# Patient Record
(demographics unavailable — no encounter records)

---

## 2024-12-04 NOTE — REVIEW OF SYSTEMS
[Change in Activity] : change in activity [Limping] : limping [Joint Pains] : arthralgias [Nl] : Psychiatric [NI] : Endocrine

## 2024-12-07 NOTE — REASON FOR VISIT
[Follow Up] : a follow up visit [Patient] : patient [Mother] : mother [FreeTextEntry1] : Right knee pain since 12/2/2024

## 2024-12-07 NOTE — DATA REVIEWED
[de-identified] : XR R knee 3 views obtained and independently reviewed in our office today: No evidence of any osseous abnormality, dislocation or fracture.

## 2024-12-07 NOTE — ASSESSMENT
[FreeTextEntry1] : Cecil is a 6 yo M with R knee pain.   XRs do not reveal any obvious osseous abnormality. Clinical exam reveals ttp over the patella, but is otherwise normal, no obvious hip discomfort, walking with a mild limp. Differential includes a bony contusion from an unwitnessed fall versus possible transient synovitis of the knee. No red flags for a septic joint at this time.  It was discussed that if the patient starts to develop fever, chills, erythema of the joint, or worsening pain, the patient should present to the emergency department for further evaluation.   No immobilization warranted, he may WBAT in regular shoes.  Patient can use tylenol/motrin as needed for pain.  We recommend avoiding gym/sports/physical activity. A school note was provided. We recommended f/u in our office in 2 weeks for clinical re-evaluation. No XR needs to be ordered in advance for f/u visit unless clinically indicated.  Today's visit included obtaining the history from the child and parent, due to the child's age, the child could not be considered a reliable historian, requiring the parent to act as an independent historian. The condition, natural history, and prognosis were explained to the patient and family. The clinical findings and images were reviewed with the family. All questions answered. Family expressed understanding and agreement with the above.  I, Ebony Kinney PA-C, acted as scribe and documented the above for Dr. Hoyt.

## 2024-12-07 NOTE — HISTORY OF PRESENT ILLNESS
[FreeTextEntry1] : Cecil is a 5-year-old male who presents with right knee pain the past 2 days. Mother reports that he came home from school 2 days ago reporting right knee pain.  No obvious injury event that patient or the school reported to family.  Every few steps patient was wanting to be picked up rather than ambulate independently.  He also had fevers Monday evening with congestion symptoms.  No obvious knee swelling or redness.  Mom did feel the knee was warm on Monday.  She kept him home from school yesterday and presents to our office today for further evaluation of right knee pain and possible injury.  No further fevers since Monday evening he is able to ambulate independently but will frequently request to be held instead of walking.  No other obvious joint discomfort he is not complaining of his hips or ankles.  He was treated by my colleague Dr. Hopkins for a left supracondylar fracture sustained on 8/4/24, which was able to be managed nonoperatively, and which patient reports full recovery from.

## 2024-12-07 NOTE — PHYSICAL EXAM
[FreeTextEntry1] : General: healthy appearing, acting appropriate for age.  HEENT: NCAT, Normal conjunctiva Cardio: Appears well perfused, no peripheral edema, brisk cap refill.  Lungs: no obvious increased WOB, no audible wheeze heard without use of stethoscope.  Abdomen: not examined.  Skin: No visible rashes on exposed skin  R knee:  No obvious effusion or swelling, no warmth, no redness. No ecchymosis or abrasions.  +TTP over the patella, reports discomfort with knee ROM, but is able to full range to the knee.  no ttp over any other structures of the RLE, no ttp over the structures of the R hip, femur, tibia, ankle.  Negative log roll. FROM of the hip without obvious discomfort or guarding.  NVI/SILT, moving toes freely WWP distally, brisk cap refill, 2+ DP  Gait: Able to ambulate independently with a mild limp.

## 2024-12-07 NOTE — DATA REVIEWED
[de-identified] : XR R knee 3 views obtained and independently reviewed in our office today: No evidence of any osseous abnormality, dislocation or fracture.

## 2024-12-07 NOTE — END OF VISIT
[FreeTextEntry3] : A physician assistant/resident assisted with documenting the visit and acted as a scribe. I have seen and examined the patient, made my assessment and plan and have made all modifications necessary to the note.  Yessica Hoyt MD Pediatric Orthopaedics Surgery [facility]

## 2024-12-07 NOTE — END OF VISIT
[FreeTextEntry3] : A physician assistant/resident assisted with documenting the visit and acted as a scribe. I have seen and examined the patient, made my assessment and plan and have made all modifications necessary to the note.  Yessica Hoyt MD Pediatric Orthopaedics Surgery [facility]

## 2025-01-08 NOTE — PHYSICAL EXAM
[FreeTextEntry1] : Pleasant and cooperative with exam, appropriate for age. Ambulates without evidence of antalgia and limp, good coordination and balance. He can run and jump with no pain.   Awake and alert appropriate for their age.   Skin: No rashes noted.  Eyes: Both conjunctiva, eyelids and pupils are present.  ENT:  Both ears, nose and lips are present. No nasal congestion.  Resp: No cough or wheezing noted.  Right Knee: Full active and passive range of motion of the knee with good muscle strength 5\5. Neurologically intact. DTRs intact. There is no palpable or audible clicking in the knee with range of motion. There is no quadriceps atrophy noted. There is no edema, effusion, erythema or ecchymosis noted. There is no pain over the tibial tubercle, patellar tendon or distal pole of the patella. There is no discomfort with palpation over the medial/lateral joint space. There is no discomfort elicited with palpation over the medial/lateral aspect of the patella. There is no discomfort with palpation over the MCL/LCL ligaments. Negative patella apprehension sign. Negative patella grind test. Negative Keyla's test. There is a good endpoint on Lachman's exam. Negative anterior/posterior drawer sign. The knee joint is stable with varus/valgus stress.   2+ pulses palpated, with capillary refill pulse one in all toes.   No active right hip pain.

## 2025-01-08 NOTE — REASON FOR VISIT
[Follow Up] : a follow up visit [Patient] : patient [Mother] : mother [Father] : father [FreeTextEntry1] : Right knee pain since 12/2/2024, 1 month status post injury

## 2025-01-08 NOTE — ASSESSMENT
[FreeTextEntry1] : Cecil is a 6 yo M with R knee pain consistent with a resolved contusion. Today's assessment was performed with the assistance of the patient's parent as an independent historian as the patient's history is unreliable.  He has an unremarkable examination with no swelling or discomfort.  He has full range of motion with full strength therefore at this time he may return to all activities and follow-up on a as needed basis.  We had a thorough talk in regard to the diagnosis, prognosis and treatment modalities.  All questions and concerns were addressed today. There was a verbal understanding from the parents and patient.  DELFINA Funes have acted as a scribe and documented the above information for Dr. Hoyt.  This note was generated using Dragon medical dictation software. A reasonable effort has been made for proofreading its contents, however typos may still remain. If there are any questions or points of clarification needed please do not hesitate to contact my office.

## 2025-01-08 NOTE — ASSESSMENT
[FreeTextEntry1] : Cecil is a 4 yo M with R knee pain consistent with a resolved contusion. Today's assessment was performed with the assistance of the patient's parent as an independent historian as the patient's history is unreliable.  He has an unremarkable examination with no swelling or discomfort.  He has full range of motion with full strength therefore at this time he may return to all activities and follow-up on a as needed basis.  We had a thorough talk in regard to the diagnosis, prognosis and treatment modalities.  All questions and concerns were addressed today. There was a verbal understanding from the parents and patient.  DELFINA Funes have acted as a scribe and documented the above information for Dr. Hoyt.  This note was generated using Dragon medical dictation software. A reasonable effort has been made for proofreading its contents, however typos may still remain. If there are any questions or points of clarification needed please do not hesitate to contact my office.

## 2025-01-08 NOTE — HISTORY OF PRESENT ILLNESS
[FreeTextEntry1] : Cecil is a 5-year-old male who presents with right knee pain the past 2 days. Mother reports that he came home from school 2 days ago reporting right knee pain.  No obvious injury event that patient or the school reported to family.  Every few steps patient was wanting to be picked up rather than ambulate independently.  He also had fevers Monday evening with congestion symptoms.  No obvious knee swelling or redness.  Mom did feel the knee was warm on Monday.  She kept him home from school yesterday and presents to our office today for further evaluation of right knee pain and possible injury.  No further fevers since Monday evening he is able to ambulate independently but will frequently request to be held instead of walking.  No other obvious joint discomfort he is not complaining of his hips or ankles. Please refer to last note from previous treatment and further details.  Today, Cecil presents to the office 1 month status post started to experience discomfort in his right knee.  As per the parents he was taking Motrin consistently for several days responding well with diminished discomfort.  He is currently very active running and jumping with no residual discomfort.  No history of fevers.  He presents today for repeat examination and activity clearance.  He was treated by my colleague Dr. Hopkins for a left supracondylar fracture sustained on 8/4/24, which was able to be managed nonoperatively, and which patient reports full recovery from.